# Patient Record
Sex: MALE | Race: WHITE | NOT HISPANIC OR LATINO
[De-identification: names, ages, dates, MRNs, and addresses within clinical notes are randomized per-mention and may not be internally consistent; named-entity substitution may affect disease eponyms.]

---

## 2018-04-02 PROBLEM — Z00.00 ENCOUNTER FOR PREVENTIVE HEALTH EXAMINATION: Status: ACTIVE | Noted: 2018-04-02

## 2018-04-25 ENCOUNTER — APPOINTMENT (OUTPATIENT)
Dept: CARDIOLOGY | Facility: CLINIC | Age: 53
End: 2018-04-25

## 2018-04-25 VITALS
HEART RATE: 42 BPM | WEIGHT: 262 LBS | HEIGHT: 71 IN | TEMPERATURE: 98.1 F | OXYGEN SATURATION: 97 % | SYSTOLIC BLOOD PRESSURE: 136 MMHG | BODY MASS INDEX: 36.68 KG/M2 | DIASTOLIC BLOOD PRESSURE: 83 MMHG

## 2018-04-25 DIAGNOSIS — Z82.49 FAMILY HISTORY OF ISCHEMIC HEART DISEASE AND OTHER DISEASES OF THE CIRCULATORY SYSTEM: ICD-10-CM

## 2018-04-25 DIAGNOSIS — Z84.89 FAMILY HISTORY OF OTHER SPECIFIED CONDITIONS: ICD-10-CM

## 2018-04-25 DIAGNOSIS — Z86.69 PERSONAL HISTORY OF OTHER DISEASES OF THE NERVOUS SYSTEM AND SENSE ORGANS: ICD-10-CM

## 2018-04-25 RX ORDER — ASPIRIN 81 MG/1
81 TABLET ORAL
Refills: 0 | Status: ACTIVE | COMMUNITY

## 2018-05-03 ENCOUNTER — NON-APPOINTMENT (OUTPATIENT)
Age: 53
End: 2018-05-03

## 2018-05-17 ENCOUNTER — APPOINTMENT (OUTPATIENT)
Dept: CARDIOLOGY | Facility: CLINIC | Age: 53
End: 2018-05-17

## 2018-06-20 ENCOUNTER — RX RENEWAL (OUTPATIENT)
Age: 53
End: 2018-06-20

## 2018-08-16 ENCOUNTER — APPOINTMENT (OUTPATIENT)
Dept: CARDIOLOGY | Facility: CLINIC | Age: 53
End: 2018-08-16

## 2018-08-16 VITALS
SYSTOLIC BLOOD PRESSURE: 143 MMHG | OXYGEN SATURATION: 99 % | DIASTOLIC BLOOD PRESSURE: 86 MMHG | TEMPERATURE: 97.8 F | WEIGHT: 262 LBS | BODY MASS INDEX: 36.54 KG/M2 | HEART RATE: 47 BPM

## 2018-08-22 ENCOUNTER — NON-APPOINTMENT (OUTPATIENT)
Age: 53
End: 2018-08-22

## 2018-10-25 ENCOUNTER — APPOINTMENT (OUTPATIENT)
Dept: HEART AND VASCULAR | Facility: CLINIC | Age: 53
End: 2018-10-25
Payer: COMMERCIAL

## 2018-10-25 VITALS
SYSTOLIC BLOOD PRESSURE: 142 MMHG | WEIGHT: 262 LBS | HEART RATE: 79 BPM | HEIGHT: 71 IN | BODY MASS INDEX: 36.68 KG/M2 | DIASTOLIC BLOOD PRESSURE: 80 MMHG

## 2018-10-25 PROCEDURE — 93000 ELECTROCARDIOGRAM COMPLETE: CPT

## 2018-10-25 PROCEDURE — 99245 OFF/OP CONSLTJ NEW/EST HI 55: CPT | Mod: 25

## 2018-10-25 RX ORDER — SUCRALFATE 1 G/1
1 TABLET ORAL
Qty: 60 | Refills: 0 | Status: DISCONTINUED | COMMUNITY
Start: 2018-03-23 | End: 2018-10-25

## 2018-10-25 RX ORDER — METOPROLOL SUCCINATE 50 MG/1
50 TABLET, EXTENDED RELEASE ORAL
Qty: 90 | Refills: 3 | Status: DISCONTINUED | COMMUNITY
Start: 2018-06-20 | End: 2018-10-25

## 2018-10-31 ENCOUNTER — APPOINTMENT (OUTPATIENT)
Dept: CARDIOLOGY | Facility: CLINIC | Age: 53
End: 2018-10-31

## 2018-10-31 VITALS
DIASTOLIC BLOOD PRESSURE: 82 MMHG | BODY MASS INDEX: 36.54 KG/M2 | OXYGEN SATURATION: 97 % | WEIGHT: 262 LBS | SYSTOLIC BLOOD PRESSURE: 145 MMHG | HEART RATE: 39 BPM

## 2019-02-08 ENCOUNTER — APPOINTMENT (OUTPATIENT)
Dept: CARDIOLOGY | Facility: CLINIC | Age: 54
End: 2019-02-08
Payer: COMMERCIAL

## 2019-02-08 VITALS
SYSTOLIC BLOOD PRESSURE: 132 MMHG | DIASTOLIC BLOOD PRESSURE: 90 MMHG | OXYGEN SATURATION: 99 % | HEART RATE: 80 BPM | BODY MASS INDEX: 36.26 KG/M2 | WEIGHT: 260 LBS

## 2019-02-08 PROCEDURE — 93000 ELECTROCARDIOGRAM COMPLETE: CPT

## 2019-02-08 PROCEDURE — 99215 OFFICE O/P EST HI 40 MIN: CPT

## 2019-02-08 NOTE — REVIEW OF SYSTEMS
[Feeling Fatigued] : feeling fatigued [Eyeglasses] : currently wearing eyeglasses [see HPI] : see HPI [Joint Pain] : joint pain [Negative] : Psychiatric

## 2019-02-11 ENCOUNTER — NON-APPOINTMENT (OUTPATIENT)
Age: 54
End: 2019-02-11

## 2019-02-11 NOTE — HISTORY OF PRESENT ILLNESS
[FreeTextEntry1] : 53-year-old man\par Routine followup\par "I feel okay " Brian denies chest pain, palpitations, shortness of breath at rest or syncope. He states that he is under stress regarding his work. He was evaluated by Dr. Marquez/electrophysiology and an electrophysiological study was advised. Mr. Pearson is requesting a second opinion prior to proceeding with the procedure.

## 2019-02-11 NOTE — PHYSICAL EXAM
[Auscultation Breath Sounds / Voice Sounds] : lungs were clear to auscultation bilaterally [Heart Sounds] : normal S1 and S2 [Murmurs] : no murmurs present [Arterial Pulses Normal] : the arterial pulses were normal [Edema] : no peripheral edema present [Bowel Sounds] : normal bowel sounds [Abnormal Walk] : normal gait [Gait - Sufficient For Exercise Testing] : the gait was sufficient for exercise testing [Cyanosis, Localized] : no localized cyanosis [] : no rash [Affect] : the affect was normal [FreeTextEntry1] : pleasant

## 2019-02-11 NOTE — DISCUSSION/SUMMARY
[FreeTextEntry1] : Ventricular ectopy.\par There is a long history of ventricular ectopy. A 4/09 Holter monitor demonstrated sinus rhythm with rare couplets but no sustained rhythm disturbance.The resting 5/18 electrocardiogram showed sinus rhythm with ventricular bigeminy. Ventricular ectopy was conducted in a left bundle normal axis configuration.  The 6/18 Holter monitor demonstrated frequent ventricular premature depolarizations with 24% of beats representing ventricular ectopy. No sustained arrhythmia was seen. The 6/14 exercise treadmill ECG study showed no evidence of myocardial ischemia. Frequent ventricular ectopy/bigeminy at rest decreased with exercise and recurred during recovery.. No exercise-induced arrhythmia was noted.\par \par In the setting of normal left ventricular systolic function  (left ventricle ejection fraction 55-60% 5/18 echocardiogram) the risk for developing of a malignant ventricular arrhythmia is low. The frequency of ventricular ectopy seen on the 6/18 Holter monitor raise concern for the future development of a cardiomyopathy. The configuration of the ventricular ectopy suggest a right ventricular origin/right ventricular dysplasia. Treatment with beta blockers (metoprolol succinate 50 mg/day) was not tolerated. Of the options for treatment long-term antiarrhythmic therapy is the least attractive. An electrophysiology study / ablation provides the most benefit with an acceptable risk.\par \par Brian was evaluated by Dr. Marquez in 10/18. An electrophysiology study was advised. Mr. Pearson is now asking for a second opinion.\par \par I have recommended the following:\par 1.consideration for cardiac MRI study\par 2. Electrophysiologic study / ablation if/when patient  consents\par 3 Second opinion electrophysiologist Dr. SARANYA Degroot [facility] arrhythmia service [phone]\par \par \par \par Hypertension\par The working diagnosis is essential hypertension exacerbated by obesity. The main clinical decision involves whether or not to administer antihypertensive medical therapy. Recent guidelines/recommendations have indicated the benefit of more aggressive antihypertensive therapy. Non-pharmacological therapy specifically diet exercise and weight loss are emphasized as major aspects of treatment. Home blood pressure monitoring has reportedly revealed normal levels. Followup blood pressure checks will be helpful to determine requirements for antihypertensive medical therapy.\par \par I have recommended  the following\par 1. Low salt low fat low cholesterol diet. Regular aerobic exercise and weight loss\par 2. Home blood pressure monitoring\par 3. Antihypertensive medical therapy dictated by response to above measures and the patient's clinical course\par \par \par \par Obesity\par Obesity exacerbates the cardiovascular issues discussed above. Bariatric surgery was performed in 3/13. The preoperative weight was 319 pounds. Brian lost  84 pounds following surgery.Today Brian is 5 foot 11 inches tall and weighs 260 pounds. Diet exercise and weight loss are  advised

## 2019-03-05 ENCOUNTER — RX RENEWAL (OUTPATIENT)
Age: 54
End: 2019-03-05

## 2019-08-01 ENCOUNTER — APPOINTMENT (OUTPATIENT)
Dept: CARDIOLOGY | Facility: CLINIC | Age: 54
End: 2019-08-01
Payer: COMMERCIAL

## 2019-08-01 VITALS
DIASTOLIC BLOOD PRESSURE: 92 MMHG | SYSTOLIC BLOOD PRESSURE: 124 MMHG | BODY MASS INDEX: 36.26 KG/M2 | OXYGEN SATURATION: 99 % | HEART RATE: 74 BPM | WEIGHT: 260 LBS

## 2019-08-01 PROCEDURE — 99214 OFFICE O/P EST MOD 30 MIN: CPT

## 2019-08-04 NOTE — HISTORY OF PRESENT ILLNESS
[FreeTextEntry1] : 54-year-old man\par Routine followup\par "I feel good." Brian denies chest pain, palpitations, shortness of breath or syncope.\par \par Brian has requested a second opinion regarding electrophysiological studies / ablation procedures for treatment of ventricular ectopy. I have recommended Dr. SARANYA Degroot [facility] [phone].

## 2019-08-04 NOTE — DISCUSSION/SUMMARY
[FreeTextEntry1] : Ventricular ectopy.\par There is a long history of ventricular ectopy. A 4/09 Holter monitor demonstrated sinus rhythm with rare couplets but no sustained rhythm disturbance.The resting 5/18 electrocardiogram showed sinus rhythm with ventricular bigeminy. Ventricular ectopy was conducted in a left bundle normal axis configuration.  The 6/18 Holter monitor demonstrated frequent ventricular premature depolarizations with 24% of beats representing ventricular ectopy. No sustained arrhythmia was seen. The 6/14 exercise treadmill ECG study showed no evidence of myocardial ischemia. Frequent ventricular ectopy/bigeminy at rest decreased with exercise and recurred during recovery.. No exercise-induced arrhythmia was noted.\par \par In the setting of normal left ventricular systolic function  (left ventricle ejection fraction 55-60% 5/18 echocardiogram) the risk for developing of a malignant ventricular arrhythmia is low. The frequency of ventricular ectopy seen on the 6/18 Holter monitor raise concern for the future development of a cardiomyopathy. The configuration of the ventricular ectopy suggest a right ventricular origin/right ventricular dysplasia. Treatment with beta blockers (metoprolol succinate 50 mg/day) was not tolerated. Of the options for treatment long-term antiarrhythmic therapy is the least attractive. An electrophysiology study / ablation provides the most benefit with an acceptable risk.\par \par Brian was evaluated by Dr. Marquez in 10/18. An electrophysiology study was advised. Mr. Pearson is now asking for a second opinion. I have recommended Dr. SARANYA Degroot [facility] [phone]\par \par I have recommended the following:\par 1.consideration for cardiac MRI study\par 2. Electrophysiologic study / ablation if/when patient  consents\par 3 Second opinion electrophysiologist Dr. BEAVER [facility] arrhythmia service [phone] as noted above\par 4. Echocardiogram with next office evaluation in 6 months\par \par \par \par Hypertension\par The working diagnosis is essential hypertension exacerbated by obesity. The main clinical decision involves whether or not to administer antihypertensive medical therapy. Recent guidelines/recommendations have indicated the benefit of more aggressive antihypertensive therapy. Non-pharmacological therapy specifically diet exercise and weight loss are emphasized as major aspects of treatment. Home blood pressure monitoring has reportedly revealed normal levels. Followup blood pressure checks will be helpful to determine requirements for antihypertensive medical therapy.\par \par I have recommended  the following\par 1. Low salt low fat low cholesterol diet. Regular aerobic exercise and weight loss\par 2. Home blood pressure monitoring\par 3. Antihypertensive medical therapy dictated by response to above measures and the patient's clinical course\par \par Valvular heart disease\par The 5/18 echocardiogram demonstrated mild mitral regurgitation and mild-moderate tricuspid regurgitation. Left ventricular ejection fraction was 55-60%. The present cardiac physical examination is not suggestive of severe mitral regurgitation. In view of the lack of symptoms, absence of heart failure and clinical course continued monitoring without intervention is indicated at this time.\par \par I have recommended the following:\par 1 no further cardiac testing for this problem at this time\par 2 echocardiogram with next office evaluation in 6 months.\par \par \par \par Obesity\par Obesity exacerbates the cardiovascular issues discussed above. Bariatric surgery was performed in 3/13. The preoperative weight was 319 pounds. Brian lost  84 pounds following surgery.Today Brian is 5 foot 11 inches tall and weighs 260 pounds. Diet exercise and weight loss are  advised

## 2019-08-04 NOTE — PHYSICAL EXAM
[Auscultation Breath Sounds / Voice Sounds] : lungs were clear to auscultation bilaterally [Heart Sounds] : normal S1 and S2 [Murmurs] : no murmurs present [Edema] : no peripheral edema present [Arterial Pulses Normal] : the arterial pulses were normal [Bowel Sounds] : normal bowel sounds [Abnormal Walk] : normal gait [Gait - Sufficient For Exercise Testing] : the gait was sufficient for exercise testing [Cyanosis, Localized] : no localized cyanosis [Affect] : the affect was normal [] : no rash [FreeTextEntry1] : pleasant

## 2020-01-21 ENCOUNTER — APPOINTMENT (OUTPATIENT)
Dept: CARDIOLOGY | Facility: CLINIC | Age: 55
End: 2020-01-21
Payer: COMMERCIAL

## 2020-01-21 PROCEDURE — 93306 TTE W/DOPPLER COMPLETE: CPT

## 2020-01-29 ENCOUNTER — APPOINTMENT (OUTPATIENT)
Dept: CARDIOLOGY | Facility: CLINIC | Age: 55
End: 2020-01-29
Payer: COMMERCIAL

## 2020-01-29 VITALS
DIASTOLIC BLOOD PRESSURE: 82 MMHG | WEIGHT: 261 LBS | OXYGEN SATURATION: 98 % | HEART RATE: 75 BPM | BODY MASS INDEX: 36.4 KG/M2 | SYSTOLIC BLOOD PRESSURE: 120 MMHG

## 2020-01-29 DIAGNOSIS — Z78.9 OTHER SPECIFIED HEALTH STATUS: ICD-10-CM

## 2020-01-29 PROCEDURE — 99214 OFFICE O/P EST MOD 30 MIN: CPT

## 2020-01-29 PROCEDURE — 93000 ELECTROCARDIOGRAM COMPLETE: CPT

## 2020-01-30 ENCOUNTER — NON-APPOINTMENT (OUTPATIENT)
Age: 55
End: 2020-01-30

## 2020-01-30 NOTE — HISTORY OF PRESENT ILLNESS
[FreeTextEntry1] : 54-year-old man\par Routine followup\par Brian was evaluated by Dr. Degroot. An electrophysiology study was advised.\par Mr. Pearson denies chest pain, shortness of breath, palpitations or syncope.

## 2020-01-30 NOTE — DISCUSSION/SUMMARY
[FreeTextEntry1] : Ventricular ectopy.\par There is a long history of ventricular ectopy. A 4/09 Holter monitor demonstrated sinus rhythm with rare couplets but no sustained rhythm disturbance.The resting 5/18 electrocardiogram showed sinus rhythm with ventricular bigeminy. Ventricular ectopy was conducted in a left bundle normal axis configuration.  The 6/18 Holter monitor demonstrated frequent ventricular premature depolarizations with 24% of beats representing ventricular ectopy. No sustained arrhythmia was seen. The 6/14 exercise treadmill ECG study showed no evidence of myocardial ischemia. Frequent ventricular ectopy/bigeminy at rest decreased with exercise and recurred during recovery.. No exercise-induced arrhythmia was noted.\par \par In the setting of normal left ventricular systolic function  (left ventricle ejection fraction 55-60% 1/20 echocardiogram) the risk for developing of a malignant ventricular arrhythmia is low. The frequency of ventricular ectopy seen on the 6/18 Holter monitor raise concern for the future development of a cardiomyopathy. The configuration of the ventricular ectopy suggest a right ventricular origin/right ventricular dysplasia. Treatment with beta blockers (metoprolol succinate 50 mg/day) was not tolerated. Of the options for treatment long-term antiarrhythmic therapy is the least attractive. An electrophysiology study / ablation provides the most benefit with an acceptable risk.\par \par Brian was evaluated by Dr. Marquez in 10/18 and  Dr. SARANYA Degroot [facility] [phone]\par \par I have recommended the following:\par 1. Cardiac MRI study\par 2. Electrophysiologic study / ablation if/when patient  consents\par \par \par \par \par Hypertension\par The working diagnosis is essential hypertension exacerbated by obesity. The main clinical decision involves whether or not to administer antihypertensive medical therapy. Recent guidelines/recommendations have indicated the benefit of more aggressive antihypertensive therapy. Non-pharmacological therapy specifically diet exercise and weight loss are emphasized as major aspects of treatment. Home blood pressure monitoring has reportedly revealed normal levels. Followup blood pressure checks will be helpful to determine requirements for antihypertensive medical therapy.\par \par I have recommended  the following\par 1. Low salt low fat low cholesterol diet. Regular aerobic exercise and weight loss\par 2. Home blood pressure monitoring\par 3. Antihypertensive medical therapy dictated by response to above measures and the patient's clinical course\par \par \par \par Valvular heart disease\par The 1/20  echocardiogram demonstrated mild mitral regurgitation and  moderate tricuspid regurgitation.Aortic valve sclerosis was also seen. Mild primary hypertension was reflected by pulmonary artery  systolic pressure of 40 mmHg. The  left ventricular ejection fraction was 55-60%. The present cardiac physical examination is not suggestive of severe mitral regurgitation. In view of the lack of symptoms, absence of heart failure and clinical course continued monitoring without intervention is indicated at this time.\par \par I have recommended the following:\par 1 no further cardiac testing for this problem at this time\par \par \par Hyperlipidemia\par Hyperlipidemia represents a risk factor for atherosclerotic heart disease. However, there is no evidence of such to date. A 1/05 stress echocardiogram was normal. Stress treadmill ECG studies were normal in 4/09, 2/13 and 6/18. The resting 1/20 electrocardiogram  shows no evidence of myocardial ischemia or infarction. The target LDL level for primary prevention is about 100. The most recent lipid levels are not available for review. The dose of atorvastatin may be increased if required to obtain optimal levels. Nonpharmacological therapy, specifically diet exercise and weight loss are emphasized as major aspects of treatment.\par \par I have recommended the following:\par 1 low salt low fat low cholesterol diet. Regular aerobic exercise and weight loss\par 2 continue present medical regimen\par 3 target LDL level to about 100 as discussed above\par 4 laboratory studies including lipid profile prior to next office evaluation in 6 months\par 5 increase atorvastatin dose if required to obtain optimal levels as noted above.\par \par \par \par \par Obesity\par Obesity exacerbates the cardiovascular issues discussed above. Bariatric surgery was performed in 3/13. The preoperative weight was 319 pounds. Brian lost  84 pounds following surgery.Today Brian is 5 foot 11 inches tall and weighs 261 pounds. Diet exercise and weight loss are  advised.\par \par \par \par The diagnosis, prognosis, risks, options alternatives were explained at length to the patient. All questions were answered. Issues discussed included ventricular ectopy, electrophysiological studies and obesity. Increased risk regarding medical decision-making for this encounter was in part related to whether or not to proceed with invasive procedures and the risk of developing a cardiomyopathy or even sudden death.\par \par Counseling and coordination of care:\par Time was a significant factor for this patient encounter. Total time spent with the patient was 30 minutes. 50% of the time was devoted towards counseling and coordination of care.

## 2020-01-30 NOTE — PHYSICAL EXAM
[Auscultation Breath Sounds / Voice Sounds] : lungs were clear to auscultation bilaterally [Heart Sounds] : normal S1 and S2 [Murmurs] : no murmurs present [Arterial Pulses Normal] : the arterial pulses were normal [Edema] : no peripheral edema present [Abnormal Walk] : normal gait [Bowel Sounds] : normal bowel sounds [Gait - Sufficient For Exercise Testing] : the gait was sufficient for exercise testing [Cyanosis, Localized] : no localized cyanosis [] : no rash [Affect] : the affect was normal [FreeTextEntry1] : full range of motion. Bilateral gynecomastia

## 2020-03-10 ENCOUNTER — RX RENEWAL (OUTPATIENT)
Age: 55
End: 2020-03-10

## 2020-08-02 DIAGNOSIS — N40.0 BENIGN PROSTATIC HYPERPLASIA WITHOUT LOWER URINARY TRACT SYMPMS: ICD-10-CM

## 2020-08-12 ENCOUNTER — APPOINTMENT (OUTPATIENT)
Dept: CARDIOLOGY | Facility: CLINIC | Age: 55
End: 2020-08-12
Payer: COMMERCIAL

## 2020-08-12 VITALS
BODY MASS INDEX: 36.12 KG/M2 | TEMPERATURE: 98.5 F | SYSTOLIC BLOOD PRESSURE: 122 MMHG | DIASTOLIC BLOOD PRESSURE: 80 MMHG | OXYGEN SATURATION: 100 % | HEART RATE: 71 BPM | WEIGHT: 259 LBS

## 2020-08-12 DIAGNOSIS — Z87.438 PERSONAL HISTORY OF OTHER DISEASES OF MALE GENITAL ORGANS: ICD-10-CM

## 2020-08-12 PROCEDURE — 93000 ELECTROCARDIOGRAM COMPLETE: CPT

## 2020-08-12 PROCEDURE — 99214 OFFICE O/P EST MOD 30 MIN: CPT

## 2020-08-13 ENCOUNTER — NON-APPOINTMENT (OUTPATIENT)
Age: 55
End: 2020-08-13

## 2020-08-13 PROBLEM — Z87.438 HISTORY OF BENIGN PROSTATIC HYPERPLASIA: Status: RESOLVED | Noted: 2020-08-13 | Resolved: 2020-08-13

## 2020-08-13 NOTE — DISCUSSION/SUMMARY
[FreeTextEntry1] : Ventricular ectopy.\par There is a long history of ventricular ectopy. A 4/09 Holter monitor demonstrated sinus rhythm with rare couplets but no sustained rhythm disturbance. A  resting 5/18 electrocardiogram showed sinus rhythm with ventricular bigeminy. Ventricular ectopy is  conducted in a left bundle normal axis configuration.  The 6/18 Holter monitor demonstrated frequent ventricular premature depolarizations with 24% of beats representing ventricular ectopy. No sustained arrhythmia was seen. The 6/14 exercise treadmill ECG study showed no evidence of myocardial ischemia. Frequent ventricular ectopy/bigeminy at rest decreased with exercise and recurred during recovery.. No exercise-induced arrhythmia was noted.\par \par In the setting of normal left ventricular systolic function  (left ventricle ejection fraction 55-60% 1/20 echocardiogram) the risk for developing of a malignant ventricular arrhythmia is low. The frequency of ventricular ectopy seen on the 6/18 Holter monitor raise concern for the future development of a cardiomyopathy. The configuration of the ventricular ectopy suggest a right ventricular origin/right ventricular dysplasia. Treatment with beta blockers (metoprolol succinate 50 mg/day) was not tolerated. Of the options for treatment long-term antiarrhythmic therapy is the least attractive. An electrophysiology study / ablation provides the most benefit with an acceptable risk.\par \par Brian was evaluated by Dr. Marquez in 10/18 and  Dr. SARANYA Degroot [facility] [phone]\par \par I have recommended the following:\par 1. Cardiac MRI study\par 2. Electrophysiologic study / ablation if/when patient  consents\par \par \par \par \par Hypertension\par The working diagnosis is essential hypertension exacerbated by obesity. The main clinical decision involves whether or not to administer antihypertensive medical therapy. Recent guidelines/recommendations have indicated the benefit of more aggressive antihypertensive therapy. Non-pharmacological therapy specifically diet exercise and weight loss are emphasized as major aspects of treatment. Home blood pressure monitoring has reportedly revealed normal levels. Followup blood pressure checks will be helpful to determine requirements for antihypertensive medical therapy.\par \par I have recommended  the following\par 1. Low salt low fat low cholesterol diet. Regular aerobic exercise and weight loss\par 2. Home blood pressure monitoring\par 3. Antihypertensive medical therapy dictated by response to above measures and the patient's clinical course\par \par \par \par Valvular heart disease\par The 1/20  echocardiogram demonstrated mild mitral regurgitation and  moderate tricuspid regurgitation.Aortic valve sclerosis was also seen. Mild primary hypertension was reflected by pulmonary artery  systolic pressure of 40 mmHg. The  left ventricular ejection fraction was 55-60%. The present cardiac physical examination is not suggestive of severe mitral regurgitation. In view of the lack of symptoms, absence of heart failure and clinical course continued monitoring without intervention is indicated at this time.\par \par I have recommended the following:\par 1 no further cardiac testing for this problem at this time\par \par \par Hyperlipidemia\par Hyperlipidemia represents a risk factor for atherosclerotic heart disease. However, there is no evidence of such to date. A 1/05 stress echocardiogram was normal. Stress treadmill ECG studies were normal in 4/09, 2/13 and 6/18. The resting 1/20 electrocardiogram  shows no evidence of myocardial ischemia or infarction. The target LDL level for primary prevention is about 100. The most recent lipid levels are not available for review. The dose of atorvastatin may be increased if required to obtain optimal levels. Nonpharmacological therapy, specifically diet exercise and weight loss are emphasized as major aspects of treatment.\par \par I have recommended the following:\par 1 low salt low fat low cholesterol diet. Regular aerobic exercise and weight loss\par 2 continue present medical regimen\par 3 target LDL level to about 100 as discussed above\par 4 laboratory studies including lipid profile prior to next office evaluation in 12/20 or 1/21\par 5 increase atorvastatin dose if required to obtain optimal levels as noted above.\par \par \par \par \par Obesity\par Obesity exacerbates the cardiovascular issues discussed above. Bariatric surgery was performed in 3/13. The preoperative weight was 319 pounds. Brian lost  84 pounds following surgery.Today Brian is 5 foot 11 inches tall and weighs 259  pounds. Diet exercise and weight loss are  advised.\par \par \par \par The diagnosis, prognosis, risks, options alternatives were explained at length to the patient. All questions were answered. Issues discussed included ventricular ectopy, electrophysiological studies and obesity. Increased risk regarding medical decision-making for this encounter was in part related to whether or not to proceed with invasive procedures and the risk of developing a cardiomyopathy or even sudden death.\par \par Counseling and coordination of care:\par Time was a significant factor for this patient encounter. Total time spent with the patient was 25 minutes. 50% of the time was devoted towards counseling and coordination of care.

## 2020-08-13 NOTE — HISTORY OF PRESENT ILLNESS
[FreeTextEntry1] : 55-year-old man\par Routine followup\par "I feel okay."  Brian denies exertional  chest pain, palpitations, shortness of breath or syncope. No cough. No fever.\par \par He does experience heartburn which he attributes to gastroesophageal reflux. He is planning to be reevaluated by Dr. Melo Maldonado/gastroenterology

## 2020-08-13 NOTE — PHYSICAL EXAM
[Auscultation Breath Sounds / Voice Sounds] : lungs were clear to auscultation bilaterally [Heart Sounds] : normal S1 and S2 [Murmurs] : no murmurs present [Edema] : no peripheral edema present [Arterial Pulses Normal] : the arterial pulses were normal [Bowel Sounds] : normal bowel sounds [Abnormal Walk] : normal gait [Gait - Sufficient For Exercise Testing] : the gait was sufficient for exercise testing [] : no rash [Affect] : the affect was normal [Cyanosis, Localized] : no localized cyanosis [FreeTextEntry1] : pleasant

## 2020-08-13 NOTE — REVIEW OF SYSTEMS
[Eyeglasses] : currently wearing eyeglasses [Feeling Fatigued] : feeling fatigued [Joint Pain] : joint pain [see HPI] : see HPI [Negative] : Psychiatric

## 2020-09-14 ENCOUNTER — RESULT REVIEW (OUTPATIENT)
Age: 55
End: 2020-09-14

## 2020-11-03 ENCOUNTER — APPOINTMENT (OUTPATIENT)
Dept: GASTROENTEROLOGY | Facility: CLINIC | Age: 55
End: 2020-11-03
Payer: COMMERCIAL

## 2020-11-03 VITALS
TEMPERATURE: 96.5 F | DIASTOLIC BLOOD PRESSURE: 80 MMHG | BODY MASS INDEX: 37.1 KG/M2 | HEIGHT: 71 IN | WEIGHT: 265 LBS | SYSTOLIC BLOOD PRESSURE: 128 MMHG | HEART RATE: 60 BPM

## 2020-11-03 PROCEDURE — 99072 ADDL SUPL MATRL&STAF TM PHE: CPT

## 2020-11-03 PROCEDURE — 99214 OFFICE O/P EST MOD 30 MIN: CPT

## 2020-11-03 NOTE — HISTORY OF PRESENT ILLNESS
[FreeTextEntry1] : patient is here because of reflux symptoms.\par \par he has experienced an increase of cough, mucous in the throat, recently\par no heartburn\par he is on dexilant sixty mg\par \par past hx is that Gastric Sleeve, 2013, Dr Maryam Ledezma\par and these symptoms had actually preceded the gastric slieve..\par and our diagnosis then had been 'silent reflux'\par \par I performed egd in march, 2018, and the finding was four or five superficial gastric prepyloric ulcerations, with neg biopsions\par he is on a baby aspirin\par \par atorvastatin.\par \par no angina but his brother had an M. I. 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

## 2020-11-03 NOTE — ASSESSMENT
[FreeTextEntry1] : 1.  patient with increased expectoration and paroxysms of coughing, and a lot of throat clearing, and he has been told he has lpr, and esoph reflux\par \par he has been kept on Dexilant\par \par 2.  his last egd was in march 2018, along with colonoscopy...and showed mult shallow antral ulcers\par \par 3.  no follow up egd since\par \par 4.  no nausea, no upper abdominal pain\par \par 5.  he had a gastric sleeve with loss of 100 pounds initially, put back about thirty pounds since the initial weight loss\par \par 6.  as the patient is scheduled for an ablation because of ventricular ectopy, I prefer waiting until after that procedure to do anything invasive such as an egd\par \par 7.  I am advising that Brian see Dr Moon first, have an exam of the throat, because the symptoms are primarily those of LPR, laryngo pharyngeal reflux\par \par 8.  and then come back, see me, and we will schedule the egd after that\par \par 9.  Gaviscon advance advised..chew and swallow one or two at a time

## 2021-01-21 ENCOUNTER — APPOINTMENT (OUTPATIENT)
Dept: CARDIOLOGY | Facility: CLINIC | Age: 56
End: 2021-01-21
Payer: COMMERCIAL

## 2021-01-21 PROCEDURE — 99072 ADDL SUPL MATRL&STAF TM PHE: CPT

## 2021-01-21 PROCEDURE — 36415 COLL VENOUS BLD VENIPUNCTURE: CPT

## 2021-01-22 ENCOUNTER — NON-APPOINTMENT (OUTPATIENT)
Age: 56
End: 2021-01-22

## 2021-01-22 LAB
ANION GAP SERPL CALC-SCNC: 12 MMOL/L
BASOPHILS # BLD AUTO: 0.03 K/UL
BASOPHILS NFR BLD AUTO: 0.6 %
BUN SERPL-MCNC: 15 MG/DL
CALCIUM SERPL-MCNC: 9.8 MG/DL
CHLORIDE SERPL-SCNC: 104 MMOL/L
CHOLEST SERPL-MCNC: 201 MG/DL
CO2 SERPL-SCNC: 24 MMOL/L
CREAT SERPL-MCNC: 0.95 MG/DL
EOSINOPHIL # BLD AUTO: 0.25 K/UL
EOSINOPHIL NFR BLD AUTO: 5.3 %
GLUCOSE SERPL-MCNC: 92 MG/DL
HCT VFR BLD CALC: 50 %
HDLC SERPL-MCNC: 53 MG/DL
HGB BLD-MCNC: 16.4 G/DL
IMM GRANULOCYTES NFR BLD AUTO: 0.6 %
LDLC SERPL CALC-MCNC: 121 MG/DL
LYMPHOCYTES # BLD AUTO: 1.45 K/UL
LYMPHOCYTES NFR BLD AUTO: 30.5 %
MAN DIFF?: NORMAL
MCHC RBC-ENTMCNC: 31.8 PG
MCHC RBC-ENTMCNC: 32.8 GM/DL
MCV RBC AUTO: 96.9 FL
MONOCYTES # BLD AUTO: 0.59 K/UL
MONOCYTES NFR BLD AUTO: 12.4 %
NEUTROPHILS # BLD AUTO: 2.41 K/UL
NEUTROPHILS NFR BLD AUTO: 50.6 %
NONHDLC SERPL-MCNC: 148 MG/DL
PLATELET # BLD AUTO: 121 K/UL
POTASSIUM SERPL-SCNC: 4.1 MMOL/L
RBC # BLD: 5.16 M/UL
RBC # FLD: 12.5 %
SODIUM SERPL-SCNC: 140 MMOL/L
TRIGL SERPL-MCNC: 136 MG/DL
WBC # FLD AUTO: 4.76 K/UL

## 2021-01-28 ENCOUNTER — NON-APPOINTMENT (OUTPATIENT)
Age: 56
End: 2021-01-28

## 2021-01-28 ENCOUNTER — APPOINTMENT (OUTPATIENT)
Dept: CARDIOLOGY | Facility: CLINIC | Age: 56
End: 2021-01-28
Payer: COMMERCIAL

## 2021-01-28 VITALS
WEIGHT: 268 LBS | TEMPERATURE: 96.8 F | BODY MASS INDEX: 37.38 KG/M2 | DIASTOLIC BLOOD PRESSURE: 90 MMHG | SYSTOLIC BLOOD PRESSURE: 134 MMHG | OXYGEN SATURATION: 90 % | HEART RATE: 84 BPM

## 2021-01-28 DIAGNOSIS — I49.3 VENTRICULAR PREMATURE DEPOLARIZATION: ICD-10-CM

## 2021-01-28 DIAGNOSIS — Z87.19 PERSONAL HISTORY OF OTHER DISEASES OF THE DIGESTIVE SYSTEM: ICD-10-CM

## 2021-01-28 PROCEDURE — 93000 ELECTROCARDIOGRAM COMPLETE: CPT

## 2021-01-28 PROCEDURE — 99213 OFFICE O/P EST LOW 20 MIN: CPT

## 2021-01-28 PROCEDURE — 99072 ADDL SUPL MATRL&STAF TM PHE: CPT

## 2021-01-28 NOTE — REVIEW OF SYSTEMS
[Feeling Fatigued] : feeling fatigued [Eyeglasses] : currently wearing eyeglasses [see HPI] : see HPI [Joint Pain] : joint pain [Negative] : Psychiatric [Cough] : cough

## 2021-01-29 PROBLEM — I49.3 VENTRICULAR ECTOPY: Status: RESOLVED | Noted: 2018-04-25 | Resolved: 2021-01-29

## 2021-01-30 PROBLEM — Z87.19 HISTORY OF GASTROESOPHAGEAL REFLUX (GERD): Status: RESOLVED | Noted: 2018-04-25 | Resolved: 2021-01-30

## 2021-01-30 NOTE — PHYSICAL EXAM
[Auscultation Breath Sounds / Voice Sounds] : lungs were clear to auscultation bilaterally [Heart Sounds] : normal S1 and S2 [Murmurs] : no murmurs present [Arterial Pulses Normal] : the arterial pulses were normal [Edema] : no peripheral edema present [Bowel Sounds] : normal bowel sounds [Abnormal Walk] : normal gait [Cyanosis, Localized] : no localized cyanosis [] : no rash [Affect] : the affect was normal [FreeTextEntry1] : pleasant

## 2021-01-30 NOTE — DISCUSSION/SUMMARY
[FreeTextEntry1] : Ventricular ectopy.\par There is a long history of ventricular ectopy. A 4/09 Holter monitor demonstrated sinus rhythm with rare couplets but no sustained rhythm disturbance. A  resting 5/18 electrocardiogram showed sinus rhythm with ventricular bigeminy. Ventricular ectopy is  conducted in a left bundle normal axis configuration.  The 6/18 Holter monitor demonstrated frequent ventricular premature depolarizations with 24% of beats representing ventricular ectopy. No sustained arrhythmia was seen. The 6/14 exercise treadmill ECG study showed no evidence of myocardial ischemia. Frequent ventricular ectopy/bigeminy at rest decreased with exercise and recurred during recovery.. No exercise-induced arrhythmia was noted.\par \par In the setting of normal left ventricular systolic function  (left ventricle ejection fraction 55-60% 1/20 echocardiogram) the risk for developing of a malignant ventricular arrhythmia is low. The frequency of ventricular ectopy seen on the 6/18 Holter monitor raise concern for the future development of a cardiomyopathy. The configuration of the ventricular ectopy suggest a right ventricular origin/right ventricular dysplasia. Treatment with beta blockers (metoprolol succinate 50 mg/day) was not tolerated. Of the options for treatment long-term antiarrhythmic therapy is the least attractive. An electrophysiology study / ablation provides the most benefit with an acceptable risk.\par \par \par Brian was evaluated by Dr. Marquez in 10/18 [facility]  and  Dr. SARANYA Degroot [facility] [phone]\par Electrophysiological study with radiofrequency ablation anticipated is planned for 6-7/21 by Dr. Degroot\par \par \par I have recommended the following:\par 1.. 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 A 1/20 echocardiogram demonstrated normal left ventricular systolic function. Left ventricular end-diastolic dimension was 5.4 cm. The left ventricular ejection fraction was 55-60%. However a 9/20 cardiac MRI now showed a right ventricular ejection fraction of 46% and mild global left ventricular hypokinesis. The left ventricle ejection fraction was 52%.There is no clinical congestive heart failure.\par \par I have recommended the following\par a. Echocardiogram immediately prior to the proposed 6-7/21 electrophysiological study/ablation\par \par \par \par \par Hypertension\par The working diagnosis is essential hypertension exacerbated by obesity. The main clinical decision involves whether or not to administer antihypertensive medical therapy. Recent guidelines/recommendations have indicated the benefit of more aggressive antihypertensive therapy. Non-pharmacological therapy specifically diet exercise and weight loss are emphasized as major aspects of treatment. Home blood pressure monitoring has reportedly revealed normal levels. Followup blood pressure checks will be helpful to determine requirements for antihypertensive medical therapy.\par \par I have recommended  the following\par 1. Low salt low fat low cholesterol diet. Regular aerobic exercise and weight loss\par 2. Home blood pressure monitoring\par 3. Antihypertensive medical therapy dictated by response to above measures and the patient's clinical course\par \par \par \par Valvular heart disease\par The 1/20  echocardiogram demonstrated mild mitral regurgitation and  moderate tricuspid regurgitation.Aortic valve sclerosis was also seen. Mild primary hypertension was reflected by pulmonary artery  systolic pressure of 40 mmHg. The  left ventricular ejection fraction was 55-60%. The present cardiac physical examination is not suggestive of severe mitral regurgitation. In view of the lack of symptoms, absence of heart failure and clinical course continued monitoring without intervention is indicated at this time.\par \par I have recommended the following:\par 1 no further cardiac testing for this problem at this time\par 2 Echocardiogram immediately prior to electrophysiological studies planned for 6-7/21\par \par \par \par \par \par Hyperlipidemia\par Hyperlipidemia represents a risk factor for atherosclerotic heart disease. However, there is no evidence of such to date. A 1/05 stress echocardiogram was normal. Stress treadmill ECG studies were normal in 4/09, 2/13 and 6/18. The resting 1/21 electrocardiogram  shows no evidence of myocardial ischemia or infarction. The target LDL level for primary prevention is about 100.  In 1/21 the serum cholesterol level was 201 HDL 53 triglycerides 136 and . The dose of atorvastatin may be increased in an effort  to obtain optimal levels. Nonpharmacological therapy, specifically diet exercise and weight loss are emphasized as major aspects of treatment.\par \par I have recommended the following:\par 1 low salt low fat low cholesterol diet. Regular aerobic exercise and weight loss\par 2 Increase atorvastatin dose from 10  to 20 mg/day\par 3 target LDL level to about 100 as discussed above\par 4 laboratory studies including lipid profile prior to next office evaluation in 6-7/21\par \par \par \par \par \par Obesity\par Obesity exacerbates the cardiovascular issues discussed above. Bariatric surgery was performed in 3/13. The preoperative weight was 319 pounds. Brian lost  84 pounds following surgery.Today Brian is 5 foot 11 inches tall and weighs 268 pounds. He has gained 9 pounds in the last 3 months  Diet exercise and weight loss are  advised.  The importance of weight loss cannot be overly emphasized\par \par \par \par The diagnosis, prognosis, risks, options alternatives were explained at length to the patient. All questions were answered. Issues discussed included ventricular ectopy, electrophysiological studies hyperlipidemia hypertension noninvasive cardiac testing obesity diet exercise and weight loss .\par \par  \par \par Counseling and coordination of care:\par Time was a significant factor for this patient encounter. Total time spent with the patient was 25 minutes. 50% of the time was devoted towards counseling and coordination of care.

## 2021-01-30 NOTE — HISTORY OF PRESENT ILLNESS
[FreeTextEntry1] : 55-year-old man\par Routine followup\par "I feel okay."  Brian denies chest pain, palpitation, shortness of breath or syncope. He is physically active without restriction or limitation. He plans to undergo ablation for frequent ventricular ectopy in 6-7/21 .\par Dr. SARANYA Degroot will be performing the procedure at [facility].

## 2021-02-02 ENCOUNTER — APPOINTMENT (OUTPATIENT)
Dept: GASTROENTEROLOGY | Facility: CLINIC | Age: 56
End: 2021-02-02
Payer: COMMERCIAL

## 2021-02-02 DIAGNOSIS — Z98.84 BARIATRIC SURGERY STATUS: ICD-10-CM

## 2021-02-02 DIAGNOSIS — K25.9 GASTRIC ULCER, UNSPECIFIED AS ACUTE OR CHRONIC, W/OUT HEMORRHAGE OR PERFORATION: ICD-10-CM

## 2021-02-02 DIAGNOSIS — K21.9 GASTRO-ESOPHAGEAL REFLUX DISEASE W/OUT ESOPHAGITIS: ICD-10-CM

## 2021-02-02 PROCEDURE — 99442: CPT

## 2021-02-02 NOTE — HISTORY OF PRESENT ILLNESS
[FreeTextEntry1] : 1. gerd\par \par 2  cough as manifestation of gerd\par \par 3.  ablation is being postponed..for ventricular ectopy, will be done in the spring\par \par 4..re manifestations of his gerd; cough had been a very frequent manifestation, and it is stable while he takes his medication with dexilant..60 mg per day\par \par no heartburn, no chest pain\par \par his last egd was done in march 2018, and a three year fu  was advised\par \par i believe we can stay on this schedule

## 2021-02-02 NOTE — ASSESSMENT
[FreeTextEntry1] : 1. patient will be having egd, and we will schedule it for two months from now\par \par 2. his ablation for multiple pvc's is being postponed until the latter spring, or the summer...to be done at [facility]\par \par 3.  we discussed Covid concerns, and we demond postpone the egd for now, to be done in early April, with a visit two weeks before, telephonic, to make sure everything is ok with covid cases, etc\par \par 4.  his meds are very simple, not on a b blocker, just atorvastatin, and baby asa, and dexilant\par \par so no holds are needed\par \par More than 50% of the face to face time was devoted to counseling and /or coordination of care.  THis coordination of care may have included reviewing other medical notes and reports, and communicating with other health professionals\par

## 2021-03-19 ENCOUNTER — RX RENEWAL (OUTPATIENT)
Age: 56
End: 2021-03-19

## 2022-04-07 ENCOUNTER — APPOINTMENT (OUTPATIENT)
Dept: CARDIOLOGY | Facility: CLINIC | Age: 57
End: 2022-04-07
Payer: COMMERCIAL

## 2022-04-07 ENCOUNTER — NON-APPOINTMENT (OUTPATIENT)
Age: 57
End: 2022-04-07

## 2022-04-07 PROCEDURE — 36415 COLL VENOUS BLD VENIPUNCTURE: CPT

## 2022-04-11 LAB
ALBUMIN SERPL ELPH-MCNC: 4.6 G/DL
ALP BLD-CCNC: 71 U/L
ALT SERPL-CCNC: 35 U/L
ANION GAP SERPL CALC-SCNC: 14 MMOL/L
AST SERPL-CCNC: 29 U/L
BASOPHILS # BLD AUTO: 0.04 K/UL
BASOPHILS NFR BLD AUTO: 0.8 %
BILIRUB SERPL-MCNC: 0.7 MG/DL
BUN SERPL-MCNC: 17 MG/DL
CALCIUM SERPL-MCNC: 9.5 MG/DL
CHLORIDE SERPL-SCNC: 103 MMOL/L
CHOLEST SERPL-MCNC: 185 MG/DL
CK SERPL-CCNC: 102 U/L
CO2 SERPL-SCNC: 23 MMOL/L
CREAT SERPL-MCNC: 0.85 MG/DL
EGFR: 102 ML/MIN/1.73M2
EOSINOPHIL # BLD AUTO: 0.32 K/UL
EOSINOPHIL NFR BLD AUTO: 6.6 %
ESTIMATED AVERAGE GLUCOSE: 120 MG/DL
GLUCOSE SERPL-MCNC: 83 MG/DL
HBA1C MFR BLD HPLC: 5.8 %
HCT VFR BLD CALC: 48.1 %
HDLC SERPL-MCNC: 54 MG/DL
HGB BLD-MCNC: 15.9 G/DL
IMM GRANULOCYTES NFR BLD AUTO: 0.4 %
LDLC SERPL CALC-MCNC: 101 MG/DL
LYMPHOCYTES # BLD AUTO: 1.44 K/UL
LYMPHOCYTES NFR BLD AUTO: 29.6 %
MAN DIFF?: NORMAL
MCHC RBC-ENTMCNC: 31.9 PG
MCHC RBC-ENTMCNC: 33.1 GM/DL
MCV RBC AUTO: 96.4 FL
MONOCYTES # BLD AUTO: 0.55 K/UL
MONOCYTES NFR BLD AUTO: 11.3 %
NEUTROPHILS # BLD AUTO: 2.5 K/UL
NEUTROPHILS NFR BLD AUTO: 51.3 %
NONHDLC SERPL-MCNC: 131 MG/DL
PLATELET # BLD AUTO: 130 K/UL
POTASSIUM SERPL-SCNC: 4.1 MMOL/L
PROT SERPL-MCNC: 7 G/DL
RBC # BLD: 4.99 M/UL
RBC # FLD: 13 %
SODIUM SERPL-SCNC: 141 MMOL/L
TRIGL SERPL-MCNC: 149 MG/DL
WBC # FLD AUTO: 4.87 K/UL

## 2022-04-18 ENCOUNTER — APPOINTMENT (OUTPATIENT)
Dept: CARDIOLOGY | Facility: CLINIC | Age: 57
End: 2022-04-18
Payer: COMMERCIAL

## 2022-04-18 ENCOUNTER — NON-APPOINTMENT (OUTPATIENT)
Age: 57
End: 2022-04-18

## 2022-04-18 VITALS
DIASTOLIC BLOOD PRESSURE: 60 MMHG | OXYGEN SATURATION: 96 % | WEIGHT: 270 LBS | SYSTOLIC BLOOD PRESSURE: 130 MMHG | HEART RATE: 46 BPM | BODY MASS INDEX: 37.66 KG/M2

## 2022-04-18 DIAGNOSIS — Z86.39 PERSONAL HISTORY OF OTHER ENDOCRINE, NUTRITIONAL AND METABOLIC DISEASE: ICD-10-CM

## 2022-04-18 PROCEDURE — 99214 OFFICE O/P EST MOD 30 MIN: CPT

## 2022-04-18 PROCEDURE — 93000 ELECTROCARDIOGRAM COMPLETE: CPT

## 2022-04-19 PROBLEM — Z86.39 HISTORY OF OBESITY: Status: RESOLVED | Noted: 2022-04-19 | Resolved: 2022-04-19

## 2022-04-19 NOTE — DISCUSSION/SUMMARY
[FreeTextEntry1] : Ventricular ectopy.\par There is a long history of ventricular ectopy. A 4/09 Holter monitor demonstrated sinus rhythm with rare couplets but no sustained rhythm disturbance. A  resting 5/18 electrocardiogram showed sinus rhythm with ventricular bigeminy. Ventricular ectopy is  conducted in a left bundle normal axis configuration  suggesting a right ventricular origin .  The 6/18 Holter monitor demonstrated frequent ventricular premature depolarizations with 24% of beats representing ventricular ectopy. No sustained arrhythmia was seen. The 6/14 exercise treadmill ECG study showed no evidence of myocardial ischemia. Frequent ventricular ectopy/bigeminy at rest decreased with exercise and recurred during recovery.. No exercise-induced arrhythmia was noted.\par \par In the setting of normal left ventricular systolic function  (left ventricle ejection fraction 55-60% 1/20 echocardiogram) the risk for developing of a malignant ventricular arrhythmia is low. The frequency of ventricular ectopy seen on the 6/18 Holter monitor raise concern for the future development of a cardiomyopathy. The configuration of the ventricular ectopy suggest a right ventricular origin/right ventricular dysplasia. Treatment with beta blockers (metoprolol succinate 50 mg/day) was not tolerated. Of the options for treatment long-term antiarrhythmic therapy is the least attractive. An electrophysiology study / ablation provides the most benefit with an acceptable risk.\par \par \par Brian was evaluated by Dr. Marquez in 10/18 [facility]  and  Dr. SARANYA Degroot [facility] [phone]\par Electrophysiological study with radiofrequency ablation anticipated. by Dr. Degroot\par \par \par I have recommended the following:\par 1.. 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 A 1/20 echocardiogram demonstrated normal left ventricular systolic function. Left ventricular end-diastolic dimension was 5.4 cm. The left ventricular ejection fraction was 55-60%. However a 9/20 cardiac MRI now showed a right ventricular ejection fraction of 46% and mild global left ventricular hypokinesis. The left ventricle ejection fraction was 52%.There is no clinical congestive heart failure.\par \par I have recommended the following\par a. Echocardiogram \par \par \par \par Hypertension\par The working diagnosis is essential hypertension exacerbated by obesity. The main clinical decision involves whether or not to administer antihypertensive medical therapy. Recent guidelines/recommendations have indicated the benefit of more aggressive antihypertensive therapy. Non-pharmacological therapy specifically diet exercise and weight loss are emphasized as major aspects of treatment. Home blood pressure monitoring has reportedly revealed normal levels. Followup blood pressure checks will be helpful to determine requirements for antihypertensive medical therapy.\par \par I have recommended  the following\par 1. Low salt low fat low cholesterol diet. Regular aerobic exercise and weight loss\par 2. Home blood pressure monitoring\par 3. Antihypertensive medical therapy dictated by response to above measures and the patient's clinical course\par \par \par \par Valvular heart disease\par The 1/20  echocardiogram demonstrated mild mitral regurgitation and  moderate tricuspid regurgitation.Aortic valve sclerosis was also seen. Mild primary hypertension was reflected by pulmonary artery  systolic pressure of 40 mmHg. The  left ventricular ejection fraction was 55-60%. The present cardiac physical examination is not suggestive of severe mitral regurgitation. In view of the lack of symptoms, absence of heart failure and clinical course continued monitoring without intervention is indicated at this time.\par \par I have recommended the following:\par 1 echocardiogram\par \par \par \par \par \par Hyperlipidemia\par Hyperlipidemia represents a risk factor for atherosclerotic heart disease. However, there is no evidence of such to date. A 1/05 stress echocardiogram was normal. Stress treadmill ECG studies were normal in 4/09, 2/13 and 6/18. The resting  4/22  electrocardiogram  shows no evidence of myocardial ischemia or infarction. The target LDL level for primary prevention is about 100.    HMG co A reductase inhibitor therapy has been effective  In 4/22 the serum cholesterol level was 185  HDL 54  triglycerides 149  and . The dose of atorvastatin may be increased in an effort  to obtain optimal levels. Nonpharmacological therapy, specifically diet exercise and weight loss are emphasized as major aspects of treatment.\par \par I have recommended the following:\par 1 low salt low fat low cholesterol diet. Regular aerobic exercise and weight loss\par 2 continue the present medical regimen\par 3 target LDL level to about 100 as discussed above\par \par Erectile dysfunction\par Brian complains of erectile dysfunction.\par \par I have recommended the following\par a. Urology consultation Dr. Doss\par \par \par \par Obesity\par Obesity exacerbates the cardiovascular issues discussed above. Bariatric surgery was performed in 3/13. The preoperative weight was 319 pounds. Brian lost  84 pounds following surgery.Today Brian is 5 foot 11 inches tall and weighs 270  pounds. He has gained 2  pounds in the last 15  months  Diet exercise and weight loss are  advised.  The importance of weight loss cannot be overly emphasized\par \par \par \par The diagnosis, prognosis, risks, options alternatives were explained at length to the patient. All questions were answered. Issues discussed included ventricular ectopy, electrophysiological studies hyperlipidemia hypertension noninvasive cardiac testing obesity diet exercise and weight loss .\par \par  \par \par Counseling and coordination of care:\par Time was a significant factor for this patient encounter. Total time spent with the patient was 30 minutes. 50% of the time was devoted towards counseling and coordination of care.

## 2022-04-19 NOTE — REVIEW OF SYSTEMS
[Feeling Fatigued] : feeling fatigued [Joint Pain] : joint pain [Negative] : Heme/Lymph [FreeTextEntry5] : see history of present illness [FreeTextEntry8] : erectile dysfunction

## 2022-04-19 NOTE — HISTORY OF PRESENT ILLNESS
[FreeTextEntry1] : 56-year-old man\par Routine followup\par \par "I feel good"  Brian denies chest pain, palpitations, shortness of breath or syncope. He is physically active without restriction or limitation. Brian continues to struggle with his weight.

## 2022-10-28 ENCOUNTER — NON-APPOINTMENT (OUTPATIENT)
Age: 57
End: 2022-10-28

## 2022-11-14 ENCOUNTER — APPOINTMENT (OUTPATIENT)
Dept: CARDIOLOGY | Facility: CLINIC | Age: 57
End: 2022-11-14

## 2022-11-14 PROCEDURE — 93306 TTE W/DOPPLER COMPLETE: CPT

## 2022-11-22 ENCOUNTER — APPOINTMENT (OUTPATIENT)
Dept: CARDIOLOGY | Facility: CLINIC | Age: 57
End: 2022-11-22

## 2022-11-22 VITALS
WEIGHT: 273 LBS | OXYGEN SATURATION: 99 % | HEART RATE: 41 BPM | BODY MASS INDEX: 38.22 KG/M2 | SYSTOLIC BLOOD PRESSURE: 147 MMHG | HEIGHT: 71 IN | DIASTOLIC BLOOD PRESSURE: 77 MMHG

## 2022-11-22 DIAGNOSIS — I10 ESSENTIAL (PRIMARY) HYPERTENSION: ICD-10-CM

## 2022-11-22 PROCEDURE — 99214 OFFICE O/P EST MOD 30 MIN: CPT

## 2022-11-27 PROBLEM — I10 HYPERTENSION, UNSPECIFIED TYPE: Status: ACTIVE | Noted: 2018-04-25

## 2022-11-27 NOTE — HISTORY OF PRESENT ILLNESS
[FreeTextEntry1] : 57-year-old man\par Routine followup\par "I feel great" Brian  denies chest pain, shortness of breath, palpitations or syncope. Mr. Pearson  continues to struggle with his weight

## 2022-11-27 NOTE — DISCUSSION/SUMMARY
[FreeTextEntry1] : Ventricular ectopy.\par There is a long history of ventricular ectopy. A 4/09 Holter monitor demonstrated sinus rhythm with rare couplets but no sustained rhythm disturbance. A  resting 5/18 electrocardiogram showed sinus rhythm with ventricular bigeminy. Ventricular ectopy is  conducted in a left bundle normal axis configuration  suggesting a right ventricular origin .  The 6/18 Holter monitor demonstrated frequent ventricular premature depolarizations with 24% of beats representing ventricular ectopy. No sustained arrhythmia was seen. The 6/14 exercise treadmill ECG study showed no evidence of myocardial ischemia. Frequent ventricular ectopy/bigeminy at rest decreased with exercise and recurred during recovery.. No exercise-induced arrhythmia was noted.\par \par In the setting of normal left ventricular systolic function  (left ventricle ejection fraction 55-60% 1/20 echocardiogram) the risk for developing of a malignant ventricular arrhythmia is low. The frequency of ventricular ectopy seen on the 6/18 Holter monitor raise concern for the future development of a cardiomyopathy. The configuration of the ventricular ectopy suggest a right ventricular origin/right ventricular dysplasia. Treatment with beta blockers (metoprolol succinate 50 mg/day) was not tolerated. Of the options for treatment long-term antiarrhythmic therapy is the least attractive. An electrophysiology study / ablation provides the most benefit with an acceptable risk.\par \par \par Brian was evaluated by Dr. Marquez in 10/18 [facility]  and  Dr. SARANYA Degroot [facility] [phone]\par Electrophysiological study with radiofrequency ablation anticipated. by Dr. Degroot\par \par \par I have recommended the following:\par 1.. 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  A  1/20 echocardiogram revealed normal left ventricular and diastolic dimension of 5.4 cm and an ejection fraction of 55-60%. However subsequently left ventricular systolic function has declined.\par \par \par  A  9/20 cardiac MRI showed a right ventricular ejection fraction of 46% and mild global left ventricular hypokinesis. The left ventricle ejection fraction was 52%.  The 11/22 echocardiogram revealed left ventricular dilatation with an end-diastolic dimension of 6.1 cm. The left ventricular ejection fraction was 56%.\par There is no clinical congestive heart failure.\par \par I have recommended the following\par a. Diet exercise and weight loss\par b. Electrophysiological study/ablation as discussed above\par \par \par \par Hypertension\par The working diagnosis is essential hypertension exacerbated by obesity. The main clinical decision involves whether or not to administer antihypertensive medical therapy. Recent guidelines/recommendations have indicated the benefit of more aggressive antihypertensive therapy. Non-pharmacological therapy specifically diet exercise and weight loss are emphasized as major aspects of treatment. Home blood pressure monitoring has reportedly revealed normal levels. Followup blood pressure checks will be helpful to determine requirements for antihypertensive medical therapy.\par \par I have recommended  the following\par 1. Low salt low fat low cholesterol diet. Regular aerobic exercise and weight loss\par 2. Home blood pressure monitoring\par 3. Antihypertensive medical therapy dictated by response to above measures and the patient's clinical course\par \par \par \par Valvular heart disease\par The 11/22  echocardiogram demonstrated mild mitral regurgitation and aortic valve sclerosis. Moderate pulmonary hypertension was reflected by a  pulmonary artery  systolic pressure  of 50 mmHg. The left ventricle ejection fraction  was 56%. The present cardiac physical examination is not suggestive of hemodynamically significant valvular pathology.\par  In view of the lack of symptoms, absence of heart failure and clinical course continued monitoring without intervention is indicated at this time.\par \par I have recommended the following:\par 1 no further cardiac testing for this problem\par \par \par \par \par Hyperlipidemia\par Hyperlipidemia represents a risk factor for atherosclerotic heart disease. However, there is no evidence of such to date. A 1/05 stress echocardiogram was normal. Stress treadmill ECG studies were normal in 4/09, 2/13 and 6/18. The resting  4/22  electrocardiogram  shows no evidence of myocardial ischemia or infarction. The target LDL level for primary prevention is about 100.    HMG co A reductase inhibitor therapy has been effective  In 4/22 the serum cholesterol level was 185  HDL 54  triglycerides 149  and . The dose of atorvastatin may be increased in an effort  to obtain optimal levels. Nonpharmacological therapy, specifically diet exercise and weight loss are emphasized as major aspects of treatment.\par \par I have recommended the following:\par 1 low salt low fat low cholesterol diet. Regular aerobic exercise and weight loss\par 2 continue the present medical regimen\par 3 target LDL level to about 100 as discussed above\par \par \par \par \par \par Obesity\par Obesity exacerbates the cardiovascular issues discussed above. Bariatric surgery was performed in 3/13. The preoperative weight was 319 pounds. Brian lost  84 pounds following surgery.Today Brian is 5 foot 11 inches tall and weighs 273  pounds. He has gained 5  pounds in the last 20   months  Diet exercise and weight loss are  advised.  The importance of weight loss cannot be overly emphasized\par \par \par \par The diagnosis, prognosis, risks, options alternatives were explained at length to the patient. All questions were answered. Issues discussed included ventricular ectopy, electrophysiological studies hyperlipidemia hypertension noninvasive cardiac testing obesity diet exercise and weight loss .\par \par  \par \par Counseling and coordination of care:\par Time was a significant factor for this patient encounter. Total time spent with the patient was 30 minutes. 50% of the time was devoted towards counseling and coordination of care.

## 2023-05-09 ENCOUNTER — RX RENEWAL (OUTPATIENT)
Age: 58
End: 2023-05-09

## 2023-05-23 ENCOUNTER — NON-APPOINTMENT (OUTPATIENT)
Age: 58
End: 2023-05-23

## 2023-05-23 ENCOUNTER — APPOINTMENT (OUTPATIENT)
Dept: CARDIOLOGY | Facility: CLINIC | Age: 58
End: 2023-05-23
Payer: COMMERCIAL

## 2023-05-23 VITALS
SYSTOLIC BLOOD PRESSURE: 160 MMHG | TEMPERATURE: 98 F | BODY MASS INDEX: 38.91 KG/M2 | DIASTOLIC BLOOD PRESSURE: 101 MMHG | HEART RATE: 70 BPM | OXYGEN SATURATION: 97 % | RESPIRATION RATE: 16 BRPM | WEIGHT: 279 LBS

## 2023-05-23 PROCEDURE — 93000 ELECTROCARDIOGRAM COMPLETE: CPT

## 2023-05-23 PROCEDURE — 99214 OFFICE O/P EST MOD 30 MIN: CPT | Mod: 25

## 2023-05-24 RX ORDER — FAMOTIDINE 10 MG/1
TABLET, FILM COATED ORAL
Refills: 0 | Status: ACTIVE | COMMUNITY

## 2023-05-24 NOTE — DISCUSSION/SUMMARY
[FreeTextEntry1] : Ventricular ectopy.\par There is a long history of ventricular ectopy. A 4/09 Holter monitor demonstrated sinus rhythm with rare couplets but no sustained rhythm disturbance. A  resting 5/18 electrocardiogram showed sinus rhythm with ventricular bigeminy. Ventricular ectopy is  conducted in a left bundle normal axis configuration  suggesting a right ventricular origin .  The 6/18 Holter monitor demonstrated frequent ventricular premature depolarizations with 24% of beats representing ventricular ectopy. No sustained arrhythmia was seen. The 6/14 exercise treadmill ECG study showed no evidence of myocardial ischemia. Frequent ventricular ectopy/bigeminy at rest decreased with exercise and recurred during recovery.. No exercise-induced arrhythmia was noted.\par A 9/20 cardiac MRI demonstrated mild global left ventricular systolic dysfunction with an ejection fraction of 52%.  The right ventricle systolic function was mildly depressed with an ejection fraction of 46%.\par \par In the setting of normal left ventricular systolic function  (left ventricle ejection fraction 56% 11/22 echocardiogram) the risk for developing of a sustained  malignant ventricular arrhythmia is low. The frequency of ventricular ectopy seen on the 6/18 Holter monitor raise concern for the  development of a cardiomyopathy. The configuration of the ventricular ectopy suggest a right ventricular origin/right ventricular dysplasia. Treatment with beta blockers (metoprolol succinate 50 mg/day) was not tolerated. Of the options for treatment long-term antiarrhythmic therapy is the least attractive. An electrophysiology study / ablation provides the most benefit with an acceptable risk.\par \par \par Brian was evaluated by Dr. Marquez in 10/18 [facility]  and  Dr. SARANYA Degroot [facility] [phone]\par Electrophysiological study with radiofrequency ablation anticipated. by Dr. Degroot\par \par \par I have recommended the following:\par 1.. Electrophysiologic study / ablation as noted above through Dr. 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  A  1/20 echocardiogram revealed normal left ventricular and diastolic dimension of 5.4 cm and an ejection fraction of 55-60%. However subsequently left ventricular systolic function has declined.\par \par \par  A  9/20 cardiac MRI showed a right ventricular ejection fraction of 46% and mild global left ventricular hypokinesis. The left ventricle ejection fraction was 52%.  The 11/22 echocardiogram revealed left ventricular dilatation with an end-diastolic dimension of 6.1 cm. The left ventricular ejection fraction was 56%.\par There is no clinical congestive heart failure.\par \par I have recommended the following\par a. Diet exercise and weight loss\par b. Electrophysiological study/ablation as discussed above\par \par \par \par Elevation of blood pressure without a diagnosis of hypertension \par The working diagnosis is labile essential hypertension exacerbated by obesity.  Elevation of blood pressure on initial examination today (160/101 mmHg) is attributed to a whitecoat effect.  The main clinical decision involves whether or not to administer antihypertensive medical therapy. Recent guidelines/recommendations have indicated the benefit of more aggressive antihypertensive therapy. Non-pharmacological therapy specifically diet exercise and weight loss are emphasized as major aspects of treatment. Home blood pressure monitoring has reportedly revealed normal levels. Followup blood pressure checks will be helpful to determine requirements for antihypertensive medical therapy.\par \par I have recommended  the following\par 1. Low salt low fat low cholesterol diet. Regular aerobic exercise and weight loss\par 2. Home blood pressure monitoring\par 3. Antihypertensive medical therapy dictated by response to above measures and the patient's clinical course\par \par \par \par Valvular heart disease\par The 11/22  echocardiogram demonstrated mild mitral regurgitation and aortic valve sclerosis. Moderate pulmonary hypertension was reflected by a  pulmonary artery  systolic pressure  of 50 mmHg. The left ventricle ejection fraction  was 56%. The present cardiac physical examination is not suggestive of hemodynamically significant valvular pathology.\par  In view of the lack of symptoms, absence of heart failure and clinical course continued monitoring without intervention is indicated at this time.\par \par I have recommended the following:\par 1 no further cardiac testing for this problem\par \par \par \par \par Hyperlipidemia\par Hyperlipidemia represents a risk factor for atherosclerotic heart disease. However, there is no evidence of such to date. A 1/05 stress echocardiogram was normal. Stress treadmill ECG studies were normal in 4/09, 2/13 and 6/18. The resting  5/23   electrocardiogram  shows no evidence of myocardial ischemia or infarction. The target LDL level for primary prevention is about 100.    HMG co A reductase inhibitor therapy has been effective  In 4/22 the serum cholesterol level was 185  HDL 54  triglycerides 149  and . The dose of atorvastatin may be increased in an effort  to obtain optimal levels. Nonpharmacological therapy, specifically diet exercise and weight loss are emphasized as major aspects of treatment.\par \par I have recommended the following:\par 1 low salt low fat low cholesterol diet. Regular aerobic exercise and weight loss\par 2 continue the present medical regimen\par 3 target LDL level to about 100 as discussed above\par \par \par \par \par \par Obesity\par Obesity exacerbates the cardiovascular issues discussed above. Bariatric surgery was performed in 3/13. The preoperative weight was 319 pounds. Brian lost  84 pounds following surgery.Today Brian is 5 foot 11 inches tall and weighs 279  pounds. He has gained 11 pounds in the last 26    months  Diet exercise and weight loss are  advised.  The importance of weight loss cannot be overly emphasized\par \par \par \par The diagnosis, prognosis, risks, options alternatives were explained at length to the patient. All questions were answered. Issues discussed included ventricular ectopy, electrophysiological studies hyperlipidemia hypertension noninvasive cardiac testing obesity diet exercise and weight loss .\par \par  \par \par Counseling and coordination of care:\par Time was a significant factor for this patient encounter. Total time spent with the patient was 30 minutes. 50% of the time was devoted towards counseling and coordination of care.

## 2023-05-24 NOTE — HISTORY OF PRESENT ILLNESS
[FreeTextEntry1] : 57-year-old man\par Routine follow-up\par "I feel okay."  Brian denies chest pain, palpitations, shortness of breath or syncope.  Brian has not scheduled the ventricular ectopy ablation procedure with Dr. Degroot  but promises to do so at this time.\par Mr. Pearson continues to struggle with his weight

## 2023-11-15 ENCOUNTER — APPOINTMENT (OUTPATIENT)
Dept: CARDIOLOGY | Facility: CLINIC | Age: 58
End: 2023-11-15
Payer: COMMERCIAL

## 2023-11-15 ENCOUNTER — NON-APPOINTMENT (OUTPATIENT)
Age: 58
End: 2023-11-15

## 2023-11-15 VITALS
SYSTOLIC BLOOD PRESSURE: 160 MMHG | HEIGHT: 71 IN | BODY MASS INDEX: 44.1 KG/M2 | HEART RATE: 67 BPM | WEIGHT: 315 LBS | DIASTOLIC BLOOD PRESSURE: 86 MMHG | OXYGEN SATURATION: 98 %

## 2023-11-15 PROCEDURE — 99214 OFFICE O/P EST MOD 30 MIN: CPT

## 2024-04-17 ENCOUNTER — RX RENEWAL (OUTPATIENT)
Age: 59
End: 2024-04-17

## 2024-04-17 RX ORDER — ATORVASTATIN CALCIUM 20 MG/1
20 TABLET, FILM COATED ORAL
Qty: 90 | Refills: 3 | Status: ACTIVE | COMMUNITY
Start: 2022-04-19 | End: 1900-01-01

## 2024-06-11 ENCOUNTER — APPOINTMENT (OUTPATIENT)
Dept: CARDIOLOGY | Facility: CLINIC | Age: 59
End: 2024-06-11
Payer: COMMERCIAL

## 2024-06-11 PROCEDURE — 93306 TTE W/DOPPLER COMPLETE: CPT

## 2024-06-12 ENCOUNTER — APPOINTMENT (OUTPATIENT)
Dept: CARDIOLOGY | Facility: CLINIC | Age: 59
End: 2024-06-12
Payer: COMMERCIAL

## 2024-06-12 DIAGNOSIS — Z86.79 PERSONAL HISTORY OF OTHER DISEASES OF THE CIRCULATORY SYSTEM: ICD-10-CM

## 2024-06-12 PROCEDURE — 36415 COLL VENOUS BLD VENIPUNCTURE: CPT

## 2024-06-16 LAB
ALBUMIN SERPL ELPH-MCNC: 4.5 G/DL
ALP BLD-CCNC: 72 U/L
ALT SERPL-CCNC: 30 U/L
ANION GAP SERPL CALC-SCNC: 13 MMOL/L
AST SERPL-CCNC: 28 U/L
BILIRUB DIRECT SERPL-MCNC: 0.1 MG/DL
BILIRUB INDIRECT SERPL-MCNC: 0.5 MG/DL
BILIRUB SERPL-MCNC: 0.6 MG/DL
BUN SERPL-MCNC: 13 MG/DL
CALCIUM SERPL-MCNC: 9.2 MG/DL
CHLORIDE SERPL-SCNC: 101 MMOL/L
CHOLEST SERPL-MCNC: 165 MG/DL
CK SERPL-CCNC: 148 U/L
CO2 SERPL-SCNC: 24 MMOL/L
CREAT SERPL-MCNC: 0.81 MG/DL
CRP SERPL HS-MCNC: 0.28 MG/L
EGFR: 102 ML/MIN/1.73M2
ESTIMATED AVERAGE GLUCOSE: 114 MG/DL
GLUCOSE SERPL-MCNC: 91 MG/DL
HBA1C MFR BLD HPLC: 5.6 %
HCT VFR BLD CALC: 46.7 %
HDLC SERPL-MCNC: 60 MG/DL
HGB BLD-MCNC: 15.1 G/DL
LDLC SERPL CALC-MCNC: 90 MG/DL
MCHC RBC-ENTMCNC: 31.7 PG
MCHC RBC-ENTMCNC: 32.3 GM/DL
MCV RBC AUTO: 97.9 FL
NONHDLC SERPL-MCNC: 105 MG/DL
PLATELET # BLD AUTO: 132 K/UL
POTASSIUM SERPL-SCNC: 4.3 MMOL/L
PROT SERPL-MCNC: 6.6 G/DL
RBC # BLD: 4.77 M/UL
RBC # FLD: 13.6 %
SODIUM SERPL-SCNC: 138 MMOL/L
TRIGL SERPL-MCNC: 79 MG/DL
WBC # FLD AUTO: 3.97 K/UL

## 2024-06-18 ENCOUNTER — NON-APPOINTMENT (OUTPATIENT)
Age: 59
End: 2024-06-18

## 2024-06-19 ENCOUNTER — NON-APPOINTMENT (OUTPATIENT)
Age: 59
End: 2024-06-19

## 2024-06-19 ENCOUNTER — APPOINTMENT (OUTPATIENT)
Dept: CARDIOLOGY | Facility: CLINIC | Age: 59
End: 2024-06-19
Payer: COMMERCIAL

## 2024-06-19 VITALS
WEIGHT: 264 LBS | OXYGEN SATURATION: 99 % | DIASTOLIC BLOOD PRESSURE: 94 MMHG | SYSTOLIC BLOOD PRESSURE: 149 MMHG | BODY MASS INDEX: 36.82 KG/M2 | HEART RATE: 69 BPM

## 2024-06-19 DIAGNOSIS — R73.03 PREDIABETES.: ICD-10-CM

## 2024-06-19 DIAGNOSIS — I42.9 CARDIOMYOPATHY, UNSPECIFIED: ICD-10-CM

## 2024-06-19 DIAGNOSIS — I49.3 VENTRICULAR PREMATURE DEPOLARIZATION: ICD-10-CM

## 2024-06-19 DIAGNOSIS — Z86.39 PERSONAL HISTORY OF OTHER ENDOCRINE, NUTRITIONAL AND METABOLIC DISEASE: ICD-10-CM

## 2024-06-19 DIAGNOSIS — Z87.438 PERSONAL HISTORY OF OTHER DISEASES OF MALE GENITAL ORGANS: ICD-10-CM

## 2024-06-19 DIAGNOSIS — D72.819 DECREASED WHITE BLOOD CELL COUNT, UNSPECIFIED: ICD-10-CM

## 2024-06-19 DIAGNOSIS — E78.5 HYPERLIPIDEMIA, UNSPECIFIED: ICD-10-CM

## 2024-06-19 DIAGNOSIS — I38 ENDOCARDITIS, VALVE UNSPECIFIED: ICD-10-CM

## 2024-06-19 DIAGNOSIS — E66.9 OBESITY, UNSPECIFIED: ICD-10-CM

## 2024-06-19 DIAGNOSIS — R03.0 ELEVATED BLOOD-PRESSURE READING, W/OUT DIAGNOSIS OF HYPERTENSION: ICD-10-CM

## 2024-06-19 PROCEDURE — 93000 ELECTROCARDIOGRAM COMPLETE: CPT

## 2024-06-19 PROCEDURE — 99214 OFFICE O/P EST MOD 30 MIN: CPT | Mod: 25

## 2024-06-20 PROBLEM — R03.0 ELEVATED BLOOD PRESSURE READING WITHOUT DIAGNOSIS OF HYPERTENSION: Status: ACTIVE | Noted: 2023-05-24

## 2024-06-20 PROBLEM — E78.5 HYPERLIPIDEMIA: Status: ACTIVE | Noted: 2019-03-05

## 2024-06-20 PROBLEM — E66.9 OBESITY: Status: ACTIVE | Noted: 2020-08-02

## 2024-06-20 PROBLEM — I42.9 CARDIOMYOPATHY: Status: ACTIVE | Noted: 2022-04-19

## 2024-06-20 PROBLEM — I49.3 VENTRICULAR ECTOPY: Status: ACTIVE | Noted: 2022-04-18

## 2024-06-20 PROBLEM — I38 VALVULAR HEART DISEASE: Status: ACTIVE | Noted: 2019-08-04

## 2024-06-23 PROBLEM — Z87.438 HISTORY OF ERECTILE DYSFUNCTION: Status: RESOLVED | Noted: 2024-06-23 | Resolved: 2024-06-23

## 2024-06-23 PROBLEM — Z86.39 HISTORY OF HYPERLIPIDEMIA: Status: RESOLVED | Noted: 2018-04-25 | Resolved: 2024-06-23

## 2024-06-23 PROBLEM — D72.819 LEUKOPENIA: Status: RESOLVED | Noted: 2018-04-25 | Resolved: 2024-06-23

## 2024-06-23 PROBLEM — R73.03 PREDIABETES: Status: RESOLVED | Noted: 2018-04-25 | Resolved: 2024-06-23

## 2024-06-23 NOTE — HISTORY OF PRESENT ILLNESS
[FreeTextEntry1] : 59-year-old male Routine follow-up  "I feel okay."  Brian denies chest pain, shortness of breath, palpitations or syncope.  He exercises by walking without restriction or limitation.  Brian continues to struggle with his weight (264 pounds)

## 2024-06-23 NOTE — DISCUSSION/SUMMARY
[FreeTextEntry1] : Ventricular ectopy. There is a long history of ventricular ectopy. A  Holter monitor demonstrated sinus rhythm with rare couplets but no sustained rhythm disturbance. A  resting  electrocardiogram showed sinus rhythm with ventricular bigeminy. Ventricular ectopy is  conducted in a left bundle normal axis configuration  suggesting a right ventricular origin .  The  Holter monitor demonstrated frequent ventricular premature depolarizations with 24% of beats representing ventricular ectopy. No sustained arrhythmia was seen. The  exercise treadmill ECG study showed no evidence of myocardial ischemia. Frequent ventricular ectopy/bigeminy at rest decreased with exercise and recurred during recovery.. No exercise-induced arrhythmia was noted. A  cardiac MRI demonstrated mild global left ventricular systolic dysfunction with an ejection fraction of 52%.  The right ventricle systolic function was mildly depressed with an ejection fraction of 46%.  In the setting of normal left ventricular systolic function  (left ventricle ejection fraction greater than 55%    echocardiogram) the risk for developing of a sustained  malignant ventricular arrhythmia is low. The frequency of ventricular ectopy seen on the  Holter monitor raise concern for the  development of a cardiomyopathy. The configuration of the ventricular ectopy suggest a right ventricular origin/right ventricular dysplasia. Treatment with beta blockers (metoprolol succinate 50 mg/day) was not tolerated. Of the options for treatment long-term antiarrhythmic therapy is the least attractive. An electrophysiology study / ablation provides the most benefit with an acceptable risk.  Brian was evaluated by Dr. Marquez in 10/18 [facility]  and  Dr. SARANYA Degroot [facility]   [phone].   Electrophysiological study with radiofrequency ablation   has  been advised.  Brian presently declines to undergo the procedure   I have recommended the followin. Reconsult  [phone]  or  1.. Electrophysiologic study / ablation   if/when  the  patient  consents   to the procedure 2.   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  A   echocardiogram revealed normal left ventricular and diastolic dimension of 5.4 cm and an ejection fraction of 55-60%. However subsequently left ventricular systolic function had declined.    A   cardiac MRI showed a right ventricular ejection fraction of 46% and mild global left ventricular hypokinesis. The left ventricle ejection fraction was 52%. The 624  echocardiogram  revealed  a  left ventricular end-diastolic dimension  increased at 6.1 cm.  However the left ventricular ejection fraction was greater than 55%.. There is no clinical congestive heart failure.  I have recommended the following a. Diet exercise and weight loss b. Electrophysiological study/ablation If/when patient consents  as discussed above     Elevation of blood pressure without a diagnosis of hypertension  The working diagnosis is labile essential hypertension exacerbated by obesity.  Elevation of blood pressure on initial examination today (149/94 mmHg) is attributed to a whitecoat effect.  The main clinical decision involves whether or not to administer antihypertensive medical therapy. Recent guidelines/recommendations have indicated the benefit of more aggressive antihypertensive therapy. Non-pharmacological therapy specifically diet exercise and weight loss are emphasized as major aspects of treatment. Home blood pressure monitoring has reportedly revealed normal levels. Follow -up blood pressure checks will be helpful to determine requirements for antihypertensive medical therapy.  I have recommended  the following 1. Low salt low fat low cholesterol diet. Regular aerobic exercise and weight loss 2. Home blood pressure monitoring 3. Antihypertensive medical therapy dictated by response to above measures and the patient's clinical course    Valvular heart disease The   echocardiogram demonstrated mild mitral/aortic/tricuspid/pulmonic  regurgitation.. Mild pulmonary hypertension was reflected by a  pulmonary artery  systolic pressure  of  37 mmHg. The left ventricle ejection fraction  was  greater than 55%. The present cardiac physical examination is not suggestive of hemodynamically significant valvular pathology.  In view of the lack of symptoms, absence of heart failure and clinical course continued monitoring without intervention is indicated at this time.  I have recommended the followin no further cardiac testing for this problem      Hyperlipidemia Hyperlipidemia represents a risk factor for atherosclerotic heart disease. However, there is no evidence of such to date. A  stress echocardiogram was normal. Stress treadmill ECG studies were normal in ,  and . The resting     electrocardiogram  shows  a left bundle branch block pattern which precludes electrocardiographic assessment of myocardial ischemia/infarction.. The target LDL level for primary prevention is about 100.    HMG co A reductase inhibitor therapy has been effective  In   the serum cholesterol level was 165  HDL 60  triglycerides 79  and LDL 90. The dose of atorvastatin may be increased  if required   to   maintain optimal levels. Nonpharmacological therapy, specifically diet exercise and weight loss are emphasized as major aspects of treatment.  I have recommended the followin low salt low fat low cholesterol diet. Regular aerobic exercise and weight loss 2 continue the present medical regimen 3 target LDL level to about 100 as discussed above     Obesity Obesity exacerbates the cardiovascular issues discussed above. Bariatric surgery was performed in 3/13. The preoperative weight was 319 pounds. Brian lost  84 pounds following surgery.Today Brian is 5 foot 11 inches tall and weighs 264  pounds. He has lgained   3   pounds in the last 6    months   Diet exercise and weight loss are  advised.  The importance of weight loss cannot be overly emphasized    The diagnosis, prognosis, risks, options alternatives were explained at length to the patient. All questions were answered. Issues discussed included ventricular ectopy, electrophysiological studies hyperlipidemia hypertension noninvasive cardiac testing obesity diet exercise and weight loss .     Counseling and coordination of care: Time was a significant factor for this patient encounter. Total time spent with the patient was 30 minutes. 50% of the time was devoted towards counseling and coordination of care.

## 2024-06-26 ENCOUNTER — APPOINTMENT (OUTPATIENT)
Dept: CARDIOLOGY | Facility: CLINIC | Age: 59
End: 2024-06-26
Payer: COMMERCIAL

## 2024-06-26 PROCEDURE — 93242 EXT ECG>48HR<7D RECORDING: CPT

## 2024-07-13 ENCOUNTER — NON-APPOINTMENT (OUTPATIENT)
Age: 59
End: 2024-07-13

## 2025-01-07 ENCOUNTER — NON-APPOINTMENT (OUTPATIENT)
Age: 60
End: 2025-01-07

## 2025-01-07 ENCOUNTER — APPOINTMENT (OUTPATIENT)
Dept: CARDIOLOGY | Facility: CLINIC | Age: 60
End: 2025-01-07
Payer: COMMERCIAL

## 2025-01-07 DIAGNOSIS — Z86.79 PERSONAL HISTORY OF OTHER DISEASES OF THE CIRCULATORY SYSTEM: ICD-10-CM

## 2025-01-07 DIAGNOSIS — I49.3 VENTRICULAR PREMATURE DEPOLARIZATION: ICD-10-CM

## 2025-01-07 DIAGNOSIS — I42.9 CARDIOMYOPATHY, UNSPECIFIED: ICD-10-CM

## 2025-01-07 DIAGNOSIS — E78.5 HYPERLIPIDEMIA, UNSPECIFIED: ICD-10-CM

## 2025-01-07 DIAGNOSIS — E66.9 OBESITY, UNSPECIFIED: ICD-10-CM

## 2025-01-07 DIAGNOSIS — R03.0 ELEVATED BLOOD-PRESSURE READING, W/OUT DIAGNOSIS OF HYPERTENSION: ICD-10-CM

## 2025-01-07 DIAGNOSIS — I38 ENDOCARDITIS, VALVE UNSPECIFIED: ICD-10-CM

## 2025-01-07 PROCEDURE — 99214 OFFICE O/P EST MOD 30 MIN: CPT | Mod: 25

## 2025-01-07 PROCEDURE — 93000 ELECTROCARDIOGRAM COMPLETE: CPT

## 2025-01-11 PROBLEM — Z86.79 HISTORY OF LEFT BUNDLE BRANCH BLOCK (LBBB): Status: RESOLVED | Noted: 2025-01-11 | Resolved: 2025-01-11

## 2025-01-11 PROBLEM — I49.3 VENTRICULAR ECTOPY: Status: RESOLVED | Noted: 2018-04-25 | Resolved: 2025-01-11

## 2025-04-07 ENCOUNTER — RX RENEWAL (OUTPATIENT)
Age: 60
End: 2025-04-07

## 2025-06-30 ENCOUNTER — APPOINTMENT (OUTPATIENT)
Dept: CARDIOLOGY | Facility: CLINIC | Age: 60
End: 2025-06-30

## 2025-08-13 ENCOUNTER — APPOINTMENT (OUTPATIENT)
Dept: CARDIOLOGY | Facility: CLINIC | Age: 60
End: 2025-08-13
Payer: COMMERCIAL

## 2025-08-13 DIAGNOSIS — I42.9 CARDIOMYOPATHY, UNSPECIFIED: ICD-10-CM

## 2025-08-13 DIAGNOSIS — Z86.79 PERSONAL HISTORY OF OTHER DISEASES OF THE CIRCULATORY SYSTEM: ICD-10-CM

## 2025-08-13 PROCEDURE — 93306 TTE W/DOPPLER COMPLETE: CPT

## 2025-08-26 ENCOUNTER — APPOINTMENT (OUTPATIENT)
Dept: CARDIOLOGY | Facility: CLINIC | Age: 60
End: 2025-08-26
Payer: COMMERCIAL

## 2025-08-26 VITALS
SYSTOLIC BLOOD PRESSURE: 155 MMHG | HEART RATE: 72 BPM | OXYGEN SATURATION: 98 % | DIASTOLIC BLOOD PRESSURE: 95 MMHG | BODY MASS INDEX: 35.98 KG/M2 | WEIGHT: 258 LBS

## 2025-08-26 DIAGNOSIS — E78.5 HYPERLIPIDEMIA, UNSPECIFIED: ICD-10-CM

## 2025-08-26 DIAGNOSIS — I49.3 VENTRICULAR PREMATURE DEPOLARIZATION: ICD-10-CM

## 2025-08-26 DIAGNOSIS — I38 ENDOCARDITIS, VALVE UNSPECIFIED: ICD-10-CM

## 2025-08-26 DIAGNOSIS — R03.0 ELEVATED BLOOD-PRESSURE READING, W/OUT DIAGNOSIS OF HYPERTENSION: ICD-10-CM

## 2025-08-26 DIAGNOSIS — E66.9 OBESITY, UNSPECIFIED: ICD-10-CM

## 2025-08-26 DIAGNOSIS — I42.9 CARDIOMYOPATHY, UNSPECIFIED: ICD-10-CM

## 2025-08-26 PROCEDURE — 99213 OFFICE O/P EST LOW 20 MIN: CPT

## 2025-08-26 PROCEDURE — G2211 COMPLEX E/M VISIT ADD ON: CPT | Mod: NC
